# Patient Record
Sex: FEMALE | URBAN - METROPOLITAN AREA
[De-identification: names, ages, dates, MRNs, and addresses within clinical notes are randomized per-mention and may not be internally consistent; named-entity substitution may affect disease eponyms.]

---

## 2019-08-27 ENCOUNTER — APPOINTMENT (OUTPATIENT)
Age: 39
Setting detail: DERMATOLOGY
End: 2019-09-03

## 2019-08-27 DIAGNOSIS — B35.1 TINEA UNGUIUM: ICD-10-CM

## 2019-08-27 DIAGNOSIS — R21 RASH AND OTHER NONSPECIFIC SKIN ERUPTION: ICD-10-CM

## 2019-08-27 PROCEDURE — OTHER ORDER TESTS: OTHER

## 2019-08-27 PROCEDURE — OTHER TREATMENT REGIMEN: OTHER

## 2019-08-27 PROCEDURE — OTHER MIPS QUALITY: OTHER

## 2019-08-27 PROCEDURE — OTHER COUNSELING: OTHER

## 2019-08-27 PROCEDURE — 99203 OFFICE O/P NEW LOW 30 MIN: CPT

## 2019-08-27 ASSESSMENT — LOCATION DETAILED DESCRIPTION DERM
LOCATION DETAILED: LEFT GREAT TOENAIL
LOCATION DETAILED: LEFT PROXIMAL DORSAL FOREARM
LOCATION DETAILED: LEFT 4TH TOENAIL
LOCATION DETAILED: LEFT INFERIOR CENTRAL MALAR CHEEK
LOCATION DETAILED: LEFT 5TH TOENAIL
LOCATION DETAILED: LEFT 3RD TOENAIL
LOCATION DETAILED: RIGHT INFERIOR CENTRAL MALAR CHEEK
LOCATION DETAILED: LEFT 2ND TOENAIL

## 2019-08-27 ASSESSMENT — LOCATION SIMPLE DESCRIPTION DERM
LOCATION SIMPLE: LEFT CHEEK
LOCATION SIMPLE: LEFT 4TH TOE
LOCATION SIMPLE: LEFT FOREARM
LOCATION SIMPLE: LEFT 2ND TOE
LOCATION SIMPLE: RIGHT CHEEK
LOCATION SIMPLE: LEFT 3RD TOE
LOCATION SIMPLE: LEFT GREAT TOE
LOCATION SIMPLE: LEFT 5TH TOE

## 2019-08-27 ASSESSMENT — LOCATION ZONE DERM
LOCATION ZONE: TOENAIL
LOCATION ZONE: FACE
LOCATION ZONE: ARM

## 2019-08-27 NOTE — PROCEDURE: TREATMENT REGIMEN
Otc Regimen: Hydrocortisone cream
Detail Level: Simple
Otc Regimen: Antifungal cream\\nAntifungal spray to shoes monthly
Plan: Unable to tolerate Terbinafine in past
Plan: Will call to get in when flared up again\\nspf\\nshows photos on phone of erythematous patches/possible plaques on cheeks, reports worse with sun exposure but clear today\\nhad labs few yrs ago for eval\\n